# Patient Record
Sex: FEMALE | Race: WHITE
[De-identification: names, ages, dates, MRNs, and addresses within clinical notes are randomized per-mention and may not be internally consistent; named-entity substitution may affect disease eponyms.]

---

## 2020-09-01 ENCOUNTER — HOSPITAL ENCOUNTER (EMERGENCY)
Dept: HOSPITAL 72 - EMR | Age: 55
Discharge: ANOTHER HOSPITAL | End: 2020-09-01
Payer: COMMERCIAL

## 2020-09-01 VITALS — BODY MASS INDEX: 23.19 KG/M2 | HEIGHT: 62 IN | WEIGHT: 126 LBS

## 2020-09-01 VITALS — SYSTOLIC BLOOD PRESSURE: 110 MMHG | DIASTOLIC BLOOD PRESSURE: 75 MMHG

## 2020-09-01 DIAGNOSIS — L03.115: ICD-10-CM

## 2020-09-01 DIAGNOSIS — E87.1: ICD-10-CM

## 2020-09-01 DIAGNOSIS — I89.0: ICD-10-CM

## 2020-09-01 DIAGNOSIS — L03.116: Primary | ICD-10-CM

## 2020-09-01 DIAGNOSIS — Z88.8: ICD-10-CM

## 2020-09-01 DIAGNOSIS — I87.8: ICD-10-CM

## 2020-09-01 DIAGNOSIS — R00.0: ICD-10-CM

## 2020-09-01 DIAGNOSIS — Z88.2: ICD-10-CM

## 2020-09-01 LAB
ADD MANUAL DIFF: NO
ALBUMIN SERPL-MCNC: 3.3 G/DL (ref 3.4–5)
ALBUMIN/GLOB SERPL: 0.4 {RATIO} (ref 1–2.7)
ALP SERPL-CCNC: 144 U/L (ref 46–116)
ALT SERPL-CCNC: 57 U/L (ref 12–78)
ANION GAP SERPL CALC-SCNC: 8 MMOL/L (ref 5–15)
APTT BLD: 33 SEC (ref 23–33)
AST SERPL-CCNC: 54 U/L (ref 15–37)
BASOPHILS NFR BLD AUTO: 0.8 % (ref 0–2)
BILIRUB SERPL-MCNC: 0.3 MG/DL (ref 0.2–1)
BUN SERPL-MCNC: 12 MG/DL (ref 7–18)
CALCIUM SERPL-MCNC: 9.5 MG/DL (ref 8.5–10.1)
CHLORIDE SERPL-SCNC: 99 MMOL/L (ref 98–107)
CK MB SERPL-MCNC: 2.1 NG/ML (ref 0–3.6)
CK SERPL-CCNC: 72 U/L (ref 26–308)
CO2 SERPL-SCNC: 28 MMOL/L (ref 21–32)
CREAT SERPL-MCNC: 0.8 MG/DL (ref 0.55–1.3)
EOSINOPHIL NFR BLD AUTO: 0.5 % (ref 0–3)
ERYTHROCYTE [DISTWIDTH] IN BLOOD BY AUTOMATED COUNT: 14 % (ref 11.6–14.8)
GLOBULIN SER-MCNC: 7.6 G/DL
HCT VFR BLD CALC: 37.6 % (ref 37–47)
HGB BLD-MCNC: 11.8 G/DL (ref 12–16)
INR PPP: 1.1 (ref 0.9–1.1)
LDH SERPL L TO P-CCNC: 227 U/L (ref 81–234)
LYMPHOCYTES NFR BLD AUTO: 10.8 % (ref 20–45)
MCV RBC AUTO: 87 FL (ref 80–99)
MONOCYTES NFR BLD AUTO: 6.6 % (ref 1–10)
NEUTROPHILS NFR BLD AUTO: 81.3 % (ref 45–75)
PLATELET # BLD: 294 K/UL (ref 150–450)
POTASSIUM SERPL-SCNC: 3.6 MMOL/L (ref 3.5–5.1)
RBC # BLD AUTO: 4.34 M/UL (ref 4.2–5.4)
SODIUM SERPL-SCNC: 135 MMOL/L (ref 136–145)
WBC # BLD AUTO: 8.1 K/UL (ref 4.8–10.8)

## 2020-09-01 PROCEDURE — 85651 RBC SED RATE NONAUTOMATED: CPT

## 2020-09-01 PROCEDURE — 71045 X-RAY EXAM CHEST 1 VIEW: CPT

## 2020-09-01 PROCEDURE — 83880 ASSAY OF NATRIURETIC PEPTIDE: CPT

## 2020-09-01 PROCEDURE — 87181 SC STD AGAR DILUTION PER AGT: CPT

## 2020-09-01 PROCEDURE — 87070 CULTURE OTHR SPECIMN AEROBIC: CPT

## 2020-09-01 PROCEDURE — 96375 TX/PRO/DX INJ NEW DRUG ADDON: CPT

## 2020-09-01 PROCEDURE — 83605 ASSAY OF LACTIC ACID: CPT

## 2020-09-01 PROCEDURE — 99291 CRITICAL CARE FIRST HOUR: CPT

## 2020-09-01 PROCEDURE — 96361 HYDRATE IV INFUSION ADD-ON: CPT

## 2020-09-01 PROCEDURE — 73630 X-RAY EXAM OF FOOT: CPT

## 2020-09-01 PROCEDURE — 87040 BLOOD CULTURE FOR BACTERIA: CPT

## 2020-09-01 PROCEDURE — 93005 ELECTROCARDIOGRAM TRACING: CPT

## 2020-09-01 PROCEDURE — 82550 ASSAY OF CK (CPK): CPT

## 2020-09-01 PROCEDURE — 83615 LACTATE (LD) (LDH) ENZYME: CPT

## 2020-09-01 PROCEDURE — 82553 CREATINE MB FRACTION: CPT

## 2020-09-01 PROCEDURE — 80053 COMPREHEN METABOLIC PANEL: CPT

## 2020-09-01 PROCEDURE — 36415 COLL VENOUS BLD VENIPUNCTURE: CPT

## 2020-09-01 PROCEDURE — 90471 IMMUNIZATION ADMIN: CPT

## 2020-09-01 PROCEDURE — 85730 THROMBOPLASTIN TIME PARTIAL: CPT

## 2020-09-01 PROCEDURE — 96374 THER/PROPH/DIAG INJ IV PUSH: CPT

## 2020-09-01 PROCEDURE — 84484 ASSAY OF TROPONIN QUANT: CPT

## 2020-09-01 PROCEDURE — 85610 PROTHROMBIN TIME: CPT

## 2020-09-01 PROCEDURE — 90715 TDAP VACCINE 7 YRS/> IM: CPT

## 2020-09-01 PROCEDURE — 85025 COMPLETE CBC W/AUTO DIFF WBC: CPT

## 2020-09-01 PROCEDURE — 87205 SMEAR GRAM STAIN: CPT

## 2020-09-01 PROCEDURE — 86140 C-REACTIVE PROTEIN: CPT

## 2020-09-01 PROCEDURE — 73590 X-RAY EXAM OF LOWER LEG: CPT

## 2020-09-01 NOTE — DIAGNOSTIC IMAGING REPORT
Indication: Foot pain

 

Technique: 3 views right foot

 

Comparison: none

 

Findings: No acute fractures. No dislocations. The joint spaces are preserved. No

radiopaque foreign body demonstrated.

 

Impression: No acute process

## 2020-09-01 NOTE — NUR
ED Nurse Note:



report given to ROBERT Ascencio. Report was given with pt's behavior including 
refusing care in the beginning and requires to be convinced and take enough 
time. The receiving nurse was questioning why we did not let pt to leave AMA. 
The nurse was told pt refuses care in the beginning but redirectable and 
eventually we were able to provide care. the nurse stated "Are you dumping pt 
to us?" the nurse was told that it is insurance purpose and we cannot let pt 
leave AMA when pt agreed with being transferred and signed on transfer paper 
with fully understanding.

## 2020-09-01 NOTE — DIAGNOSTIC IMAGING REPORT
Indication: Right leg pain

 

Technique: 2 views of the right tibia and fibula

 

Comparison: none

 

Findings: No acute fractures. No dislocations. The joint spaces are preserved

 

Impression: Negative

## 2020-09-01 NOTE — EMERGENCY ROOM REPORT
History of Present Illness


General


Chief Complaint:  Skin Rash/Abscess


Source:  Patient, Medical Record





Present Illness


HPI


55-year-old female with chronic venous stasis presents ambulance with complaint 

of rash to the bilateral lower extremity.  She was hospitalized 2 months ago in 

[place] for similar complaint and was discharged with oral antibiotics.  

She has not taken any recent antibiotics and states that with continued walking

, the pain gets worse.  She admits to chills and pain with weightbearing.  

Otherwise denies fever, cough, hemoptysis, hematemesis, melena, hematochezia, 

nausea, vomiting, diarrhea or rapidly progressive rash.  She denies history of 

chronic kidney disease or congestive heart failure.


Denies IVDU or IM drug use. 


She states she is allergic to Bactrim and vancomycin.


The patient's symptoms were gradual onset, severity was moderate, duration 

since 1 to 2 months.  


Quality: Aching





Past medical history: Chronic venous stasis


Past surgical history:  Denies





Smoking:  Denies


Alcohol use:  Denies


Drug use:  Denies





Review of systems:


CONST: No fevers or chills, No night sweats


PULMONARY: No productive cough,  No shortness of breath 


CARDIAC: No chest pain, No palpitations 


GI: No vomiting, No diarrhea , No melena_or_BRBPR 


: No dysuria, No hematuria, No discharge 


NEURO: No new_focal_weakness_or_numbness, No confusion, No vision changes


14 point Review of Systems is otherwise negative except per HPI





Physical Exam:


GENERAL: Awake_alert_ nontoxic, no acute distress Spo2 98% on RA -normal


EYES: Extraocular muscles are intact. Conjunctivae clear. Lids without swelling


ENT: External nose and ear normal_in_appearance. Oropharynx clear. Head_

atraumatic, Moist_oral_mucosa


NECK:  No JVD. No meningismus. No thyromegaly.  Supple. Trachea midline


RESP: Normal respiratory effort. Symmetric rise. No stridor. Clear_to_

auscultation_No_rales_No_wheezes


CARDIAC: Tachycardic.  And regular rhytm.  Left greater than right pedal edema.


ABDOMEN: Soft. Nondistended.  Nontender_No_rebound_or_guarding.


MSK:  Normal muscle tone, without rigidity.  Extremities without asymmetric 

deformity or swelling.  


SKIN: Venous stasis and lymphedema to the bilateral lower extremity (left is 

worse than right).


Erythematous and warm cellulitic rash to the left lower extremity.  

Circumferential.  No palpable crepitus.  Weeping serosanguineous material.  

Pain with palpation of rash.


Erythematous warm and cellulitic rash to the right lower extremity.  Not as 

severe as her left lower extremity.  No palpable crepitus.  Also wheezing.


NEUROLOGIC: Alert, oriented x3.  Motor_and_sensation_grossly_intact. No truncal 

ataxia. Gait_normal


Psych: Normal mood and affect, normal judgment and insight











- COORDINATION OF CARE


Case was discussed with: Patient  





Any labs and imaging that were ordered were interpreted as part of the medical 

decision making:








Medical Decision Making/Plan:





Differential diagnosis includes sepsis / severe sepsis   , cellulitis , osteo, 

UTI, pneumonia , viral syndrome,  renal failure, congestive heart failure, 

arthritis, venous stasis , abscess, WITHOUT evidence for compartment syndrome, 

septic arthritis, arterial occlusion, deep venous thrombosis, among others.





Vitals show tachycardia. Exam of BLE is concerning for acute on chronic 

cellulitis infection of venous stasis. Wounds are weeping.  


  


Sepsis bundle initiated on arrival. 


Blood cultures, lactate drawn.   


Lactate was elevated along with ESR and CRP, patient was refused  30 cc/kg  IV 

fluids by bolus. 


Empiric antibiotics were started.   


COVID negative.


No evidence of renal failure or CHF.


Chest x-ray is unremarkable.





Patient will be admitted to the hospital for further care and evaluation.





Patient will be transferred to [facility] [place]


Will be transported by S.


Accepting physician Dr Holder





- CRITICAL CARE STATEMENT -


Critical care performed  45  minutes


 Time is exclusive of separately billable procedures. 


Time includes: direct patient care, patient reassessment, coordination of 

patient care, review of patient's medical records, medical consultation, family 

consultation regarding treatment decisions and documentation of patient care.  


Organ systems at risk:   Cardiac /  Circulatory / DERM


Allergies:  


Coded Allergies:  


     SULFAMETHOXAZOLE (Verified  Allergy, Intermediate, 9/1/20)


     TRIMETHOPRIM (Verified  Allergy, Intermediate, 9/1/20)


     LEVETIRACETAM (Unverified  Allergy, Unknown, 9/1/20)


     VANCOMYCIN (Unverified  Allergy, Unknown, 9/1/20)





COVID-19 Screening


Contact w/high risk pt:  No


Experienced COVID-19 symptoms?:  No


COVID-19 Testing performed PTA:  No





Patient History


Pregnant Now:  No





Nursing Documentation-[facility]


Past Medical History:  No History, Except For





Physical Exam





Vital Signs








  Date Time  Temp Pulse Resp B/P (MAP) Pulse Ox O2 Delivery O2 Flow Rate FiO2


 


9/1/20 07:21 98.1 105 16 103/63 (76) 98 Room Air  








Sp02 EP Interpretation:  reviewed, normal





Medical Decision Making


Diagnostic Impression:  


 Primary Impression:  


 Cellulitis


 Additional Impressions:  


 Venous stasis


 Hyponatremia


 Lymphedema





EKG Diagnostic Results


PA Scribe Ally


12-lead EKG (interpreted by me) 


Time: 07 53


Indication: Rhythm analysis


Tracing visualized and Interpreted by me. Rhythm: Sinus tachycardia


Rate: 100 bpm


QTc: 417


Morphology: No_significant_ST_elevations_or_depressions, No STEMI


Impression: Sinus tachycardia without significant abnormalities.





Rhythm Strip Diag. Results


Rhythm Strip Time:  09:08


EP Interpretation:  yes


Rate:  80


Rhythm:  NSR, no PVC's, no ectopy





Chest X-Ray Diagnostic Results


Chest X-Ray Diagnostic Results :  


   CLAIRE Canales


Chest X-Ray: 


Views: [ 1 ] view(s)


Indication: Sepsis hyperinflated lungs.


Findings: Normal heart size.  Mediastinum normal.  No infiltrate.


Impression: No pneumonia.  No pleural effusion.


The X-ray(s) were independently viewed and interpreted contemporaneously


Electronically signed by Cassie ceron DO 





Right foot X-ray: 


Views:  3  view(s)


No fracture.  Normal alignment.   Joint spaces normal.  


Indication: cellulitis 


Impression:   Soft tissue swelling/cellulitis. no subcutaneous gas


The X-ray(s) were independently viewed and interpreted contemporaneously - 

Electronically signed by Cassie ceron DO





Left  Foot X-ray: 


Views:  3  view(s)


No fracture.  Normal alignment.  Joint spaces normal.  


Indication: cellulitis


Impression:   Soft tissue swelling/cellulitis. no subcutaneous gas 


The X-ray(s) were independently viewed and interpreted contemporaneously - 

Electronically signed by Cassie ceron DO





Right   Tibia /Fibula X-ray: 


Views:  2  view(s)


No fracture.  Normal alignment.  Soft tissues normal.  Joint spaces normal.  


Indication: cellulitis 


Impression:   Soft tissue swelling/cellulitis. no subcutaneous gas


The X-ray(s) were independently viewed and interpreted contemporaneously - 

Electronically signed by Cassie ceron DO





Left   Tibia /Fibula X-ray: 


Views:  2  view(s)


No fracture.  Normal alignment.   Joint spaces normal.  


Indication: cellulitis 


Impression:   Soft tissue swelling/cellulitis. no subcutaneous gas


The X-ray(s) were independently viewed and interpreted contemporaneously - 

Electronically signed by Cassie ceron DO


Reevaluation Time:  09:08





Last Vital Signs








  Date Time  Temp Pulse Resp B/P (MAP) Pulse Ox O2 Delivery O2 Flow Rate FiO2


 


9/1/20 07:21 98.1 105 16 103/63 (76) 98 Room Air  








Status:  improved


Disposition:  ADMITTED AS INPATIENT - [facility]


Admit Decision Time:  07:36


Condition:  Stable


Scripts


No Active Prescriptions or Reported Meds











Cassie Bartholomew D.O. Sep 1, 2020 07:36

## 2020-09-01 NOTE — NUR
ED Nurse Note:



Patient brought in by ambulance from street/homeless due to BLE infection. per 
pt, it is chronic condition which she has had for 10 years. pt aao x4 and 
ambulatory but weak and slow. pt tends to be agitated and refuses care for no 
reason. redirectable but consistently refusing changing to gown, medications, 
iv insertion, being on cardiac monitor. currently, iv HL made on Lt AC 22 G, pt 
changed to gown, medications given but Tdap shot. pt refused to be on cardiac 
monitor. pt denied chest pain, SOB, cough, fever.

## 2020-09-01 NOTE — DIAGNOSTIC IMAGING REPORT
Indication: Cough

 

Technique: One view of the chest

 

Comparison: none

 

Findings: Fibronodular scarring is seen in the left lung apex. Left lateral basilar

nipple shadow noted. The heart size is normal.

 

Impression: No acute process

## 2020-09-01 NOTE — DIAGNOSTIC IMAGING REPORT
Indication: Leg pain

 

Technique: 2 views of the left tibia and fibula

 

Comparison: none

 

Findings: No acute fractures. No dislocations. Normal bony alignment.

 

Impression: No acute process